# Patient Record
Sex: MALE | Race: WHITE | URBAN - METROPOLITAN AREA
[De-identification: names, ages, dates, MRNs, and addresses within clinical notes are randomized per-mention and may not be internally consistent; named-entity substitution may affect disease eponyms.]

---

## 2022-01-14 ENCOUNTER — OFFICE VISIT (OUTPATIENT)
Dept: URBAN - METROPOLITAN AREA CLINIC 64 | Facility: CLINIC | Age: 42
End: 2022-01-14

## 2022-01-14 DIAGNOSIS — S05.02XA CORNEAL ABRASION OF LEFT EYE, INITIAL ENCOUNTER: Primary | ICD-10-CM

## 2022-01-14 PROCEDURE — 99203 OFFICE O/P NEW LOW 30 MIN: CPT | Performed by: STUDENT IN AN ORGANIZED HEALTH CARE EDUCATION/TRAINING PROGRAM

## 2022-01-14 RX ORDER — ERYTHROMYCIN 5 MG/G
OINTMENT OPHTHALMIC
Qty: 5 | Refills: 0 | Status: ACTIVE
Start: 2022-01-14

## 2022-01-14 ASSESSMENT — INTRAOCULAR PRESSURE
OS: 17
OD: 21

## 2022-01-14 NOTE — IMPRESSION/PLAN
Impression: Corneal abrasion of left eye, initial encounter: S05. 02XA. Plan: Linear abrasion 3 mm long OS. Pt d/c abx drops from ER, and start Erythromycin TID OS. Pt wear eye protection while working in the meantime. F/U PRN Schedule pt with Dr. Brynn Reinoso or Dr. Marcela Warren for Refraction

## 2022-01-21 ENCOUNTER — OFFICE VISIT (OUTPATIENT)
Dept: URBAN - METROPOLITAN AREA CLINIC 64 | Facility: CLINIC | Age: 42
End: 2022-01-21

## 2022-01-21 DIAGNOSIS — H52.13 MYOPIA, BILATERAL: Primary | ICD-10-CM

## 2022-01-21 ASSESSMENT — KERATOMETRY
OD: 41.12
OS: 2056.8

## 2022-01-21 ASSESSMENT — VISUAL ACUITY
OS: 20/20
OD: 20/20

## 2022-01-21 NOTE — IMPRESSION/PLAN
Impression: Myopia, bilateral: H52.13. Plan: Patient seen in clinic today for a RC.  Glasses RX given